# Patient Record
Sex: MALE | Race: WHITE | NOT HISPANIC OR LATINO | Employment: FULL TIME | URBAN - METROPOLITAN AREA
[De-identification: names, ages, dates, MRNs, and addresses within clinical notes are randomized per-mention and may not be internally consistent; named-entity substitution may affect disease eponyms.]

---

## 2024-06-03 PROBLEM — R93.1 ELEVATED CORONARY ARTERY CALCIUM SCORE: Status: ACTIVE | Noted: 2024-06-03

## 2024-06-03 PROBLEM — E55.9 VITAMIN D DEFICIENCY: Status: ACTIVE | Noted: 2024-06-03

## 2024-06-04 ENCOUNTER — OFFICE VISIT (OUTPATIENT)
Dept: CARDIOLOGY | Facility: CLINIC | Age: 64
End: 2024-06-04
Payer: COMMERCIAL

## 2024-06-04 VITALS
BODY MASS INDEX: 22.2 KG/M2 | HEIGHT: 77 IN | HEART RATE: 67 BPM | DIASTOLIC BLOOD PRESSURE: 87 MMHG | SYSTOLIC BLOOD PRESSURE: 139 MMHG | OXYGEN SATURATION: 96 % | WEIGHT: 188 LBS

## 2024-06-04 DIAGNOSIS — R93.1 ELEVATED CORONARY ARTERY CALCIUM SCORE: ICD-10-CM

## 2024-06-04 DIAGNOSIS — I25.10 MILD CAD: Primary | ICD-10-CM

## 2024-06-04 PROCEDURE — 1036F TOBACCO NON-USER: CPT | Performed by: INTERNAL MEDICINE

## 2024-06-04 PROCEDURE — 99214 OFFICE O/P EST MOD 30 MIN: CPT | Performed by: INTERNAL MEDICINE

## 2024-06-04 RX ORDER — ATORVASTATIN CALCIUM 40 MG/1
1 TABLET, FILM COATED ORAL DAILY
COMMUNITY
Start: 2021-08-23 | End: 2024-06-04 | Stop reason: SDUPTHER

## 2024-06-04 RX ORDER — ASPIRIN 81 MG/1
TABLET ORAL
COMMUNITY
Start: 2022-09-12

## 2024-06-04 RX ORDER — OMEPRAZOLE 20 MG/1
CAPSULE, DELAYED RELEASE ORAL
COMMUNITY
Start: 2016-06-20

## 2024-06-04 RX ORDER — ATORVASTATIN CALCIUM 80 MG/1
80 TABLET, FILM COATED ORAL DAILY
Qty: 90 TABLET | Refills: 3 | Status: SHIPPED | OUTPATIENT
Start: 2024-06-04 | End: 2025-06-04

## 2024-06-04 ASSESSMENT — ENCOUNTER SYMPTOMS
BLOATING: 0
COUGH: 0
ALTERED MENTAL STATUS: 0
CHILLS: 0
MYALGIAS: 0
WHEEZING: 0
FEVER: 0
DYSURIA: 0
ABDOMINAL PAIN: 0
NAUSEA: 0
HEMATURIA: 0
FALLS: 0
DIARRHEA: 0
DEPRESSION: 0
VOMITING: 0
CONSTIPATION: 0
HEMOPTYSIS: 0
MEMORY LOSS: 0
HEADACHES: 0

## 2024-06-04 ASSESSMENT — PATIENT HEALTH QUESTIONNAIRE - PHQ9
SUM OF ALL RESPONSES TO PHQ9 QUESTIONS 1 AND 2: 0
1. LITTLE INTEREST OR PLEASURE IN DOING THINGS: NOT AT ALL
2. FEELING DOWN, DEPRESSED OR HOPELESS: NOT AT ALL

## 2024-06-04 NOTE — PATIENT INSTRUCTIONS
Increase Atorvastatin from 40 to 80mg 1x/day    If get frequent skips, can get walk-in EKG here (M-F 8-430pm)

## 2024-06-04 NOTE — PROGRESS NOTES
Chief complaint:  Follow-up     HPI  64 yo WM w/ h/o mild CAD, elevated CCS, FHx CAD (dad) now here for cardiology f/u. Last week he was having freq skips with some vague chest tight when he became anxious about skips; resolved with H2B/PPI. Also with assoc sore throat/cough.  No other chest pain. No dyspnea at rest. +occ mild ROACH. No orthopnea/PND. No palps. No LH/dizziness/syncope. No edema. No claudication.   He bikes up to 25 miles 2-3d/wk with no symptoms.  ECG 7/20: SB, rare PVC, nonsp T-wave flat  ECG 8/21: SB (52), nonsp T-wave changes  CLAY 7/20: antlat ischemia, EF 55-60% -> 55-60%, no CP, nl EKG  CCS 7/20: 309 (LM 24, , LCx 0, RCA 0)  Cath 7/20: LM ok, pLAD 40%, LCx ok, Ramus ok, RCA ok, EF 55%, tr MR, no AS  CXR 6/20: no acute abnl     Review of Systems   Constitutional: Negative for chills, fever and malaise/fatigue.   HENT:  Negative for hearing loss.    Eyes:  Negative for visual disturbance.   Respiratory:  Negative for cough, hemoptysis and wheezing.    Skin:  Negative for rash.   Musculoskeletal:  Negative for falls and myalgias.   Gastrointestinal:  Negative for bloating, abdominal pain, constipation, diarrhea, dysphagia, nausea and vomiting.   Genitourinary:  Negative for dysuria and hematuria.   Neurological:  Negative for headaches.   Psychiatric/Behavioral:  Negative for altered mental status, depression and memory loss.       Social History     Tobacco Use    Smoking status: Never     Passive exposure: Never    Smokeless tobacco: Never   Substance Use Topics    Alcohol use: Yes     Alcohol/week: 10.0 standard drinks of alcohol     Types: 10 Cans of beer per week      Family History   Problem Relation Name Age of Onset    Coronary artery disease Father        No Known Allergies     Current Outpatient Medications   Medication Instructions    aspirin 81 mg EC tablet oral, Daily RT    atorvastatin (Lipitor) 40 mg tablet 1 tablet, oral, Daily    omeprazole (PriLOSEC) 20 mg DR capsule oral     "  Vitals:    06/04/24 1425   BP: (!) 132/91   Pulse: 67   SpO2: 96%      /87 (BP Location: Left arm, Patient Position: Sitting)   Pulse 67   Ht 1.956 m (6' 5\")   Wt 85.3 kg (188 lb)   SpO2 96%   BMI 22.29 kg/m²      Physical Exam  Constitutional:       Appearance: Normal appearance.   HENT:      Head: Normocephalic and atraumatic.      Nose: Nose normal.   Neck:      Vascular: No carotid bruit.   Cardiovascular:      Rate and Rhythm: Normal rate and regular rhythm.      Heart sounds: No murmur heard.  Pulmonary:      Effort: Pulmonary effort is normal.      Breath sounds: Normal breath sounds.   Abdominal:      Palpations: Abdomen is soft.      Tenderness: There is no abdominal tenderness.   Musculoskeletal:      Right lower leg: No edema.      Left lower leg: No edema.   Skin:     General: Skin is warm and dry.   Neurological:      General: No focal deficit present.      Mental Status: He is alert.   Psychiatric:         Mood and Affect: Mood normal.         Judgment: Judgment normal.        Results/Data  1/23 Cr 1.0, K 4.1 LDL 82, HDL 58, TG 96, Chol 159, HGB 14.6, , TSH 3.21  8/21 Cr 0.92, K 4.4, LFT nl, LDL 69, HDL 54, TG 56, Chol 134  3/21 Cr 0.92, K 4.5, LFT nl, LDL 79, HDL 53, TG 77, Chol 147  7/20 Cr 0.93, K 4.0, LFT nl, , HDL 64, , Chol 211, HGB 15,   2019 , HDL 72, TG 95  2/18 Cr 0.9, K 4.5, ALT 17, , HDL 62, , Chol 196, HGB 14.3, , TSH 2.14     Assessment/Plan   62 yo WM w/ h/o mild CAD, elevated CCS, FHx (dad). Doing well. No sig cardiac symptoms. His skips are c/w PVCs. If recurs, get EKG (and/or monitor). He also has some symptoms c/w GERD as evidenced by resolved on PPI.  Cath 7/20 w/ pLAD 40%.  He will check some BP at home; claims usually like 110/70.  -continue ASA 81 qd (with food)  -increase Atorva from 40 to 80 qhs -> goal LDL <70  -f/u 2 years (earlier if needed); he prefers less frequent     Ezekiel Jones MD  "

## 2024-06-15 ENCOUNTER — TELEPHONE (OUTPATIENT)
Dept: PEDIATRICS | Facility: CLINIC | Age: 64
End: 2024-06-15
Payer: COMMERCIAL

## 2024-06-15 DIAGNOSIS — J32.9 SINOBRONCHITIS: Primary | ICD-10-CM

## 2024-06-15 DIAGNOSIS — J40 SINOBRONCHITIS: Primary | ICD-10-CM

## 2024-06-15 RX ORDER — AMOXICILLIN AND CLAVULANATE POTASSIUM 875; 125 MG/1; MG/1
1 TABLET, FILM COATED ORAL 2 TIMES DAILY
Qty: 20 TABLET | Refills: 0 | Status: SHIPPED | OUTPATIENT
Start: 2024-06-15 | End: 2024-06-25

## 2024-06-17 NOTE — TELEPHONE ENCOUNTER
Cold for over 2 weeks, was improving but over the past 48 hours, cough became productive, achey, no fever but concern for bronchitis/sinusitis. I prescribed augmentin and to follow up with PCP if not improving in 48 hours or sooner is symptoms worsen.

## 2025-07-29 PROCEDURE — 88305 TISSUE EXAM BY PATHOLOGIST: CPT | Performed by: DERMATOLOGY

## 2025-08-06 ENCOUNTER — LAB REQUISITION (OUTPATIENT)
Dept: DERMATOPATHOLOGY | Facility: CLINIC | Age: 65
End: 2025-08-06
Payer: COMMERCIAL

## 2025-08-06 DIAGNOSIS — D48.5 NEOPLASM OF UNCERTAIN BEHAVIOR OF SKIN: ICD-10-CM

## 2025-08-07 LAB
LABORATORY COMMENT REPORT: NORMAL
PATH REPORT.FINAL DX SPEC: NORMAL
PATH REPORT.GROSS SPEC: NORMAL
PATH REPORT.MICROSCOPIC SPEC OTHER STN: NORMAL
PATH REPORT.RELEVANT HX SPEC: NORMAL
PATH REPORT.TOTAL CANCER: NORMAL

## 2025-09-04 ENCOUNTER — TELEPHONE (OUTPATIENT)
Dept: PRIMARY CARE | Facility: CLINIC | Age: 65
End: 2025-09-04
Payer: COMMERCIAL

## 2025-09-23 ENCOUNTER — APPOINTMENT (OUTPATIENT)
Dept: PRIMARY CARE | Facility: CLINIC | Age: 65
End: 2025-09-23
Payer: COMMERCIAL